# Patient Record
Sex: MALE | Race: WHITE | NOT HISPANIC OR LATINO | URBAN - METROPOLITAN AREA
[De-identification: names, ages, dates, MRNs, and addresses within clinical notes are randomized per-mention and may not be internally consistent; named-entity substitution may affect disease eponyms.]

---

## 2018-07-26 ENCOUNTER — APPOINTMENT (OUTPATIENT)
Dept: RADIOLOGY | Facility: MEDICAL CENTER | Age: 63
End: 2018-07-26
Attending: EMERGENCY MEDICINE
Payer: COMMERCIAL

## 2018-07-26 ENCOUNTER — HOSPITAL ENCOUNTER (OUTPATIENT)
Dept: RADIOLOGY | Facility: MEDICAL CENTER | Age: 63
End: 2018-07-26

## 2018-07-26 ENCOUNTER — HOSPITAL ENCOUNTER (EMERGENCY)
Facility: MEDICAL CENTER | Age: 63
End: 2018-07-27
Attending: EMERGENCY MEDICINE
Payer: COMMERCIAL

## 2018-07-26 DIAGNOSIS — L03.119 CELLULITIS OF FOOT: ICD-10-CM

## 2018-07-26 DIAGNOSIS — M79.89 FOOT SWELLING: ICD-10-CM

## 2018-07-26 LAB
BASOPHILS # BLD AUTO: 0.7 % (ref 0–1.8)
BASOPHILS # BLD: 0.05 K/UL (ref 0–0.12)
CRP SERPL HS-MCNC: 2.43 MG/DL (ref 0–0.75)
EOSINOPHIL # BLD AUTO: 0.2 K/UL (ref 0–0.51)
EOSINOPHIL NFR BLD: 3 % (ref 0–6.9)
ERYTHROCYTE [DISTWIDTH] IN BLOOD BY AUTOMATED COUNT: 40.2 FL (ref 35.9–50)
ERYTHROCYTE [SEDIMENTATION RATE] IN BLOOD BY WESTERGREN METHOD: 38 MM/HOUR (ref 0–20)
HCT VFR BLD AUTO: 34.8 % (ref 42–52)
HGB BLD-MCNC: 12.3 G/DL (ref 14–18)
IMM GRANULOCYTES # BLD AUTO: 0.02 K/UL (ref 0–0.11)
IMM GRANULOCYTES NFR BLD AUTO: 0.3 % (ref 0–0.9)
LYMPHOCYTES # BLD AUTO: 1.35 K/UL (ref 1–4.8)
LYMPHOCYTES NFR BLD: 20.1 % (ref 22–41)
MCH RBC QN AUTO: 31.4 PG (ref 27–33)
MCHC RBC AUTO-ENTMCNC: 35.3 G/DL (ref 33.7–35.3)
MCV RBC AUTO: 88.8 FL (ref 81.4–97.8)
MONOCYTES # BLD AUTO: 0.4 K/UL (ref 0–0.85)
MONOCYTES NFR BLD AUTO: 6 % (ref 0–13.4)
NEUTROPHILS # BLD AUTO: 4.7 K/UL (ref 1.82–7.42)
NEUTROPHILS NFR BLD: 69.9 % (ref 44–72)
NRBC # BLD AUTO: 0 K/UL
NRBC BLD-RTO: 0 /100 WBC
PLATELET # BLD AUTO: 196 K/UL (ref 164–446)
PMV BLD AUTO: 10.6 FL (ref 9–12.9)
RBC # BLD AUTO: 3.92 M/UL (ref 4.7–6.1)
WBC # BLD AUTO: 6.7 K/UL (ref 4.8–10.8)

## 2018-07-26 PROCEDURE — A9270 NON-COVERED ITEM OR SERVICE: HCPCS | Performed by: EMERGENCY MEDICINE

## 2018-07-26 PROCEDURE — 85025 COMPLETE CBC W/AUTO DIFF WBC: CPT

## 2018-07-26 PROCEDURE — 86140 C-REACTIVE PROTEIN: CPT

## 2018-07-26 PROCEDURE — 36415 COLL VENOUS BLD VENIPUNCTURE: CPT

## 2018-07-26 PROCEDURE — 73700 CT LOWER EXTREMITY W/O DYE: CPT | Mod: RT

## 2018-07-26 PROCEDURE — 99284 EMERGENCY DEPT VISIT MOD MDM: CPT

## 2018-07-26 PROCEDURE — 700102 HCHG RX REV CODE 250 W/ 637 OVERRIDE(OP): Performed by: EMERGENCY MEDICINE

## 2018-07-26 PROCEDURE — 85652 RBC SED RATE AUTOMATED: CPT

## 2018-07-26 RX ORDER — CEPHALEXIN 500 MG/1
500 CAPSULE ORAL 3 TIMES DAILY
Qty: 30 CAP | Refills: 0 | Status: SHIPPED | OUTPATIENT
Start: 2018-07-26 | End: 2018-08-05

## 2018-07-26 RX ORDER — CEPHALEXIN 500 MG/1
500 CAPSULE ORAL ONCE
Status: COMPLETED | OUTPATIENT
Start: 2018-07-27 | End: 2018-07-26

## 2018-07-26 RX ADMIN — CEPHALEXIN 500 MG: 500 CAPSULE ORAL at 23:42

## 2018-07-26 ASSESSMENT — LIFESTYLE VARIABLES: DO YOU DRINK ALCOHOL: NO

## 2018-07-26 NOTE — LETTER
[facility], EMERGENCY DEPT   [address], [place] [address]  Phone: Dept: [phone] - Fax:        [facility] Progress Report and Disability Certification  Date of Service: 7/26/2018   No Show:  No  Date / Time of Next Visit:     Claim Information   Patient Name: Jeromy Reina  Claim Number:     Employer: [employer]  Date of Injury: 7/23/2018     Insurer / TPA:  [place] [employer] Insurance ID / SSN: [id number]   Occupation: Equip  Diagnosis: Diagnoses of Cellulitis of foot and Foot swelling were pertinent to this visit.    Medical Information   Related to Industrial Injury?     Subjective Complaints:      Objective Findings:     Pre-Existing Condition(s):     Assessment:        Status:    Permanent Disability:     Plan:      Diagnostics:      Comments:       Disability Information   Status:      From:     Through:   Restrictions are:     Physical Restrictions   Sitting:    Standing:    Stooping:    Bending:      Squatting:    Walking:    Climbing:    Pushing:      Pulling:    Other:    Reaching Above Shoulder (L):   Reaching Above Shoulder (R):       Reaching Below Shoulder (L):    Reaching Below Shoulder (R):      Not to exceed Weight Limits   Carrying(hrs):   Weight Limit(lb):   Lifting(hrs):   Weight  Limit(lb):     Comments:      Repetitive Actions   Hands: i.e. Fine Manipulations from Grasping:     Feet: i.e. Operating Foot Controls:     Driving / Operate Machinery:     Physician Name: Alisia Curtis Physician Signature: ALISIA Calhoun M.D. e-Signature:  , Medical Director   Clinic Name / Location: [facility], EMERGENCY DEPT  [address] [place] [id number] [phone]     Clinic Phone Number: Dept: [phone]   Appointment Time:  Visit Start Time:    Check-In Time:  8:43 PM Visit Discharge Time: 1:28 AM   Original-Treating Physician or Chiropractor    Page 2-Insurer/TPA    Page  3-Employer    Page 4-Employee

## 2018-07-26 NOTE — LETTER
[facility], EMERGENCY DEPT   [address], [place] [address]  Phone: Dept: [phone] - Fax:        [facility] Progress Report and Disability Certification  Date of Service: 7/26/2018   No Show:  No  Date / Time of Next Visit:     Claim Information   Patient Name: Jeromy Reina  Claim Number:     Employer: [employer]  Date of Injury: 7/23/2018     Insurer / TPA:  [employer] Insurance ID / SSN: [id number]   Occupation: Equip  Diagnosis: Diagnoses of Cellulitis of foot and Foot swelling were pertinent to this visit.    Medical Information   Related to Industrial Injury?      Subjective Complaints:  Logging accident with injury to right leg, blisters to the area   Objective Findings: Edema to right leg, echymosis noted   Pre-Existing Condition(s):     Assessment:        Status: Additional Care Required  Permanent Disability:     Plan: Medication    Diagnostics: CT    Comments:  Reviewed studies    Disability Information   Status: Temporarily Totally Disabled    From:     Through:   Restrictions are:     Physical Restrictions   Sitting:    Standing:    Stooping:    Bending:      Squatting:    Walking:    Climbing:    Pushing:      Pulling:    Other:    Reaching Above Shoulder (L):   Reaching Above Shoulder (R):       Reaching Below Shoulder (L):    Reaching Below Shoulder (R):      Not to exceed Weight Limits   Carrying(hrs):   Weight Limit(lb):   Lifting(hrs):   Weight  Limit(lb):     Comments:      Repetitive Actions   Hands: i.e. Fine Manipulations from Grasping:     Feet: i.e. Operating Foot Controls:     Driving / Operate Machinery:     Physician Name: Alisia Curtis Physician Signature: ALISIA Calhoun M.D. e-Signature:  , Medical Director   Clinic Name / Location: [facility], EMERGENCY DEPT  [address] [facility] [address]  [phone]     Clinic Phone Number: Dept: [phone]      Appointment Time:  Visit Start Time:    Check-In Time:  8:43 PM Visit Discharge Time: 1:28 AM   Original-Treating Physician or Chiropractor    Page 2-Insurer/TPA    Page 3-Employer    Page 4-Employee

## 2018-07-26 NOTE — LETTER
"  FORM C-4:  EMPLOYEE’S CLAIM FOR COMPENSATION/ REPORT OF INITIAL TREATMENT  EMPLOYEE’S CLAIM - PROVIDE ALL INFORMATION REQUESTED   First Name  Jeromy Last Name  Latosha Birthdate             Age  1955 63 y.o. 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 (if applicable)   parking tractor   How did this injury or occupational disease occur? Be specific and answer in detail. Use additional sheet if necessary)  Parking the tractor, bank gave way tractor rolled, threw me out and the machine landed on my foot.   If you believe that you have an occupational disease, when did you first have knowledge of the disability and it relationship to your employment?   Witnesses to the Accident    n/a   Nature of Injury or Occupational Disease  Workers Comp  Part(s) of Body Injured or Affected     Foot (R)   I certify that the above is true and correct to the best of my knowledge and that I have provided this information in order to obtain the benefits of [place]  I hereby authorize any physician, chiropractor, surgeon, practitioner, or other person, any hospital, including [facility] or [facility] hospital, any medical service organization, any insurance company, or other institution or organization to release to each other, any medical or other information, including benefits paid or payable, pertinent to this injury or disease, except information relative to diagnosis, treatment and/or counseling for AIDS, psychological conditions, alcohol or controlled substances, for which I must give specific authorization.  A Photostat of this authorization shall be as valid as the original.   Date  07/27/18 Place   Employee’s Signature   THIS REPORT MUST BE COMPLETED AND MAILED WITHIN 3 WORKING DAYS OF TREATMENT   Place  [facility], EMERGENCY DEPT  Name of Facility   [facility]   Date  7/26/2018 Diagnosis  (L03.119) Cellulitis of foot  (M79.89) Foot swelling Is there evidence the injured employee was under the influence of alcohol and/or another controlled substance at the time of accident?   Hour  12:29 AM Description of Injury or Disease  Cellulitis of foot  Foot swelling No   Treatment  antibiotics  Have you advised the patient to remain off work five days or more?         Yes   X-Ray Findings  Negative   If Yes   From Date  7/26/2018 To Date  8/1/2018   From information given by the employee, together with medical evidence, can you directly connect this injury or occupational disease as job incurred?  Yes If No, is the employee capable of: Full Duty  No Modified Duty  Yes   Is additional medical care by a physician indicated?  Yes If Modified Duty, Specify any Limitations / Restrictions  No walking     Do you know of any previous injury or disease contributing to this condition or occupational disease?  No   Date  7/27/2018 Print Doctor’s Name  Alisia Curtis I certify the employer’s copy of  "this form was mailed on:   Address  [address]  [place] [address] [phone] Insurer’s Use Only   [facility]  [address]    Provider’s Tax ID Number  [id number] Telephone  Dept: [phone]    Doctor’s Signature  e-JORGE Lomax M.D., MD    Original - TREATING PHYSICIAN OR CHIROPRACTOR   Pg 2-Insurer/TPA   Pg 3-Employer   Pg 4-Employee                                                                                                  Form C-4 (rev01/03)     BRIEF DESCRIPTION OF RIGHTS AND BENEFITS  (Pursuant to [place] 616C.050)    Notice of Injury or Occupational Disease (Incident Report Form C-1): If an injury or occupational disease (OD) arises out of and in the course of employment, you must provide written notice to your employer as soon as practicable, but no later than 7 days after the accident or OD. Your employer shall maintain a sufficient supply of the required forms.  Claim for Compensation (Form C-4): If medical treatment is sought, the form C-4 is available at the place of initial treatment. A completed \"Claim for Compensation\" (Form C-4) must be filed within 90 days after an accident or OD. The treating physician or chiropractor must, within 3 working days after treatment, complete and mail to the employer, the employer's insurer and third-party , the Claim for Compensation.  Medical Treatment: If you require medical treatment for your on-the-job injury or OD, you may be required to select a physician or chiropractor from a list provided by your workers’ compensation insurer, if it has contracted with an Organization for Managed Care (MCO) or Preferred Provider Organization (PPO) or providers of health care. If your employer has not entered into a contract with an MCO or PPO, you may select a physician or chiropractor from the Panel of Physicians and Chiropractors. Any medical costs related to your industrial injury or OD will be paid by your " insurer.  Temporary Total Disability (TTD): If your doctor has certified that you are unable to work for a period of at least 5 consecutive days, or 5 cumulative days in a 20-day period, or places restrictions on you that your employer does not accommodate, you may be entitled to TTD compensation.  Temporary Partial Disability (TPD): If the wage you receive upon reemployment is less than the compensation for TTD to which you are entitled, the insurer may be required to pay you TPD compensation to make up the difference. TPD can only be paid for a maximum of 24 months.  Permanent Partial Disability (PPD): When your medical condition is stable and there is an indication of a PPD as a result of your injury or OD, within 30 days, your insurer must arrange for an evaluation by a rating physician or chiropractor to determine the degree of your PPD. The amount of your PPD award depends on the date of injury, the results of the PPD evaluation and your age and wage.  Permanent Total Disability (PTD): If you are medically certified by a treating physician or chiropractor as permanently and totally disabled and have been granted a PTD status by your insurer, you are entitled to receive monthly benefits not to exceed 66 2/3% of your average monthly wage. The amount of your PTD payments is subject to reduction if you previously received a PPD award.  Vocational Rehabilitation Services: You may be eligible for vocational rehabilitation services if you are unable to return to the job due to a permanent physical impairment or permanent restrictions as a result of your injury or occupational disease.  Transportation and Per [place] Reimbursement: You may be eligible for travel expenses and per mason associated with medical treatment.  Reopening: You may be able to reopen your claim if your condition worsens after claim closure.  Appeal Process: If you disagree with a written determination issued by the insurer or the insurer does not  respond to your request, you may appeal to the Department of Administration, , by following the instructions contained in your determination letter. You must appeal the determination within 70 days from the date of the determination letter at [address], [address], [place], [place] [address], or [address], [address], [place], [place] [address]. If you disagree with the  decision, you may appeal to the Department of Administration, . You must file your appeal within 30 days from the date of the  decision letter at [address], [address], [place], [place] [address], or [address], [address], [place], [place] [address]. If you disagree with a decision of an , you may file a petition for judicial review with the District Court. You must do so within 30 days of the Appeal Officer’s decision. You may be represented by an  at your own expense or you may contact the [facility] for possible representation.  [place]  for Injured Workers (NAIW): If you disagree with a  decision, you may request that NAIW represent you without charge at an  Hearing. For information regarding denial of benefits, you may contact the [facility] at: [address], [address], [place] [address], [phone], or [address], [address], [place] [address], [phone]  To File a Complaint with the Division: If you wish to file a complaint with the  of the Division of Industrial Relations (DIR), please contact the Workers’ Compensation Section, [address], [address], [place], [place] [address], telephone (669) [phone], or [address] [facility] [address], [place] [address], telephone ([phone]) [phone].  For assistance with Workers’ Compensation Issues: you may contact the Office of the Governor Consumer Health Assistance, [address],  [address], [place], [place] [address], Toll Free 1-992.895.4101, Web site: http://govcha.[facility].nv., E-mail garrison@[facility].nv.                                                                                                                                                                               __________________________________________________________________                                    _________________            Employee Name / Signature                                                                                                                            Date                                       D-2 (rev. 10/07)

## 2018-07-26 NOTE — LETTER
"  FORM C-4:  EMPLOYEE’S CLAIM FOR COMPENSATION/ REPORT OF INITIAL TREATMENT  EMPLOYEE’S CLAIM - PROVIDE ALL INFORMATION REQUESTED   First Name  Jeromy Last Name  Latosha Birthdate             Age  1955 63 y.o. 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 (if applicable)  parking tractor    How did this injury or occupational disease occur? Be specific and answer in detail. Use additional sheet if necessary)  parking the tractor, bank gave way tractor rolled, threw me out and the machine landed on my foot.   If you believe that you have an occupational disease, when did you first have knowledge of the disability and it relationship to your employment?  n/a Witnesses to the Accident  n/a     Nature of Injury or Occupational Disease  Workers' Compensation  Part(s) of Body Injured or Affected  Foot (R), N/A, N/A    I certify that the above is true and correct to the best of my knowledge and that I have provided this information in order to obtain the benefits of [place]ative Insurance and " [place]  I hereby authorize any physician, chiropractor, surgeon, practitioner, or other person, any hospital, including [facility] or [facility] hospital, any medical service organization, any insurance company, or other institution or organization to release to each other, any medical or other information, including benefits paid or payable, pertinent to this injury or disease, except information relative to diagnosis, treatment and/or counseling for AIDS, psychological conditions, alcohol or controlled substances, for which I must give specific authorization.  A Photostat of this authorization shall be as valid as the original.   Date  07/27/18 Place  [facility]  Employee’s Signature   THIS REPORT MUST BE COMPLETED AND MAILED WITHIN 3 WORKING DAYS OF TREATMENT   Place  [facility], EMERGENCY DEPT  Name of Facility   [facility]   Date  7/26/2018 Diagnosis  (L03.119) Cellulitis of foot  (M79.89) Foot swelling Is there evidence the injured employee was under the influence of alcohol and/or another controlled substance at the time of accident?   Hour  12:35 AM Description of Injury or Disease  Cellulitis of foot  Foot swelling No   Treatment  antibiotics  Have you advised the patient to remain off work five days or more?         Yes   X-Ray Findings  Negative   If Yes   From Date  7/26/2018 To Date  8/1/2018   From information given by the employee, together with medical evidence, can you directly connect this injury or occupational disease as job incurred?  Yes If No, is the employee capable of: Full Duty  No Modified Duty  Yes   Is additional medical care by a physician indicated?  Yes If Modified Duty, Specify any Limitations / Restrictions  No walking     Do you know of any previous injury or disease contributing to this condition or occupational disease?  No   Date  7/27/2018 Print  "Doctor’s Name  Jorge Curtis I certify the employer’s copy of this form was mailed on:   Address  [address] [address] [phone] Insurer’s Use Only   [facility]  [address]    Provider’s Tax ID Number  [id number] Telephone  Dept: [phone]    Doctor’s Signature  e-JORGE Lomax M.D. Degree   MD    Original - TREATING PHYSICIAN OR CHIROPRACTOR   Pg 2-Insurer/TPA   Pg 3-Employer   Pg 4-Employee                                                                                                  Form C-4 (rev01/03)     BRIEF DESCRIPTION OF RIGHTS AND BENEFITS  (Pursuant to [place]RS 616C.050)    Notice of Injury or Occupational Disease (Incident Report Form C-1): If an injury or occupational disease (OD) arises out of and in the course of employment, you must provide written notice to your employer as soon as practicable, but no later than 7 days after the accident or OD. Your employer shall maintain a sufficient supply of the required forms.  Claim for Compensation (Form C-4): If medical treatment is sought, the form C-4 is available at the place of initial treatment. A completed \"Claim for Compensation\" (Form C-4) must be filed within 90 days after an accident or OD. The treating physician or chiropractor must, within 3 working days after treatment, complete and mail to the employer, the employer's insurer and third-party , the Claim for Compensation.  Medical Treatment: If you require medical treatment for your on-the-job injury or OD, you may be required to select a physician or chiropractor from a list provided by your workers’ compensation insurer, if it has contracted with an Organization for Managed Care (MCO) or Preferred Provider Organization (PPO) or providers of health care. If your employer has not entered into a contract with an MCO or PPO, you may select a physician or chiropractor from the Panel of Physicians and Chiropractors. Any medical costs related " to your industrial injury or OD will be paid by your insurer.  Temporary Total Disability (TTD): If your doctor has certified that you are unable to work for a period of at least 5 consecutive days, or 5 cumulative days in a 20-day period, or places restrictions on you that your employer does not accommodate, you may be entitled to TTD compensation.  Temporary Partial Disability (TPD): If the wage you receive upon reemployment is less than the compensation for TTD to which you are entitled, the insurer may be required to pay you TPD compensation to make up the difference. TPD can only be paid for a maximum of 24 months.  Permanent Partial Disability (PPD): When your medical condition is stable and there is an indication of a PPD as a result of your injury or OD, within 30 days, your insurer must arrange for an evaluation by a rating physician or chiropractor to determine the degree of your PPD. The amount of your PPD award depends on the date of injury, the results of the PPD evaluation and your age and wage.  ermanent Total Disability (PTD): If you are medically certified by a treating physician or chiropractor as permanently and totally disabled and have been granted a PTD status by your insurer, you are entitled to receive monthly benefits not to exceed 66 2/3% of your average monthly wage. The amount of your PTD payments is subject to reduction if you previously received a PPD award.  Vocational Rehabilitation Services: You may be eligible for vocational rehabilitation services if you are unable to return to the job due to a permanent physical impairment or permanent restrictions as a result of your injury or occupational disease.  Transportation and Per Mason Reimbursement: You may be eligible for travel expenses and per mason associated with medical treatment.  Reopening: You may be able to reopen your claim if your condition worsens after claim closure.  Appeal Process: If you disagree with a written  determination issued by the insurer or the insurer does not respond to your request, you may appeal to the Department of Administration, , by following the instructions contained in your determination letter. You must appeal the determination within 70 days from the date of the determination letter at [address], [address], [place], [place] [address], or [address], [address], [place], [place] [address]. If you disagree with the  decision, you may appeal to the Department of Administration, . You must file your appeal within 30 days from the date of the  decision letter at [address], [address], [place], [place] [address], or [address], [address], [place], [place] [address]. If you disagree with a decision of an , you may file a petition for judicial review with the District Court. You must do so within 30 days of the Appeal Officer’s decision. You may be represented by an  at your own expense or you may contact the [facility] for possible representation.  [place]  for Injured Workers (NAIW): If you disagree with a  decision, you may request that NAIW represent you without charge at an  Hearing. For information regarding denial of benefits, you may contact the [facility] at: [address], [address], [place] [address], [phone], or [address], [place] [address], [phone]  To File a Complaint with the Division: If you wish to file a complaint with the  of the Division of Industrial Relations (DIR), please contact the Workers’ Compensation Section, [address], [address], [place], [place] [address], telephone (723) 473-4299, or [address], [address], [place] [address], telephone [phone]) [phone].  For assistance with Workers’ Compensation Issues: you may contact the Office of the  [facility] Consumer Health Assistance, [address], [address], [place] [address], Toll Free 1-608.661.1662, Web site: http://govcha.[facility].nv., E-mail garrison@[facility].nv.                                                                                                                                                                               __________________________________________________________________                                    _________________            Employee Name / Signature                                                                                                                            Date                                       D-2 (rev. 10/07)

## 2018-07-26 NOTE — LETTER
[facility], EMERGENCY DEPT   [address], [place] [address]  Phone: Dept: [phone] - Fax:        [facility] Progress Report and Disability Certification  Date of Service: 7/26/2018   No Show:  No  Date / Time of Next Visit:     Claim Information   Patient Name: Jeromy Reina  Claim Number:     Employer: [employer]  Date of Injury: 7/23/2018     Insurer / TPA:  [employer] Insurance ID / SSN: [id number]   Occupation: Equip  Diagnosis: Diagnoses of Cellulitis of foot and Foot swelling were pertinent to this visit.    Medical Information   Related to Industrial Injury? Yes ***   Subjective Complaints:    Logging accident with injury to right leg, blisters to the area   Objective Findings: Edema to right leg, echymosis noted   Pre-Existing Condition(s):     Assessment:        Status: Additional Care Required  Permanent Disability:     Plan: Medication    Diagnostics: CT    Comments:  Reviewed studies    Disability Information   Status: Temporarily Totally Disabled    From:     Through:   Restrictions are:     Physical Restrictions   Sitting:    Standing:    Stooping:    Bending:      Squatting:    Walking:    Climbing:    Pushing:      Pulling:    Other:    Reaching Above Shoulder (L):   Reaching Above Shoulder (R):       Reaching Below Shoulder (L):    Reaching Below Shoulder (R):      Not to exceed Weight Limits   Carrying(hrs):   Weight Limit(lb):   Lifting(hrs):   Weight  Limit(lb):     Comments:      Repetitive Actions   Hands: i.e. Fine Manipulations from Grasping:     Feet: i.e. Operating Foot Controls:     Driving / Operate Machinery:     Physician Name: Alisia Curtis Physician Signature: ALISIA Calhoun M.D. e-Signature:  , Medical Director   Clinic Name / Location: [facility], EMERGENCY DEPT  [address] [address]  [phone]     Clinic Phone Number: Dept:  [phone]   Appointment Time:  Visit Start Time:    Check-In Time:  8:43 PM Visit Discharge Time: 1:28 AM   Original-Treating Physician or Chiropractor    Page 2-Insurer/TPA    Page 3-Employer    Page 4-Employee

## 2018-07-27 VITALS
HEIGHT: 68 IN | HEART RATE: 82 BPM | WEIGHT: 190 LBS | RESPIRATION RATE: 20 BRPM | OXYGEN SATURATION: 96 % | TEMPERATURE: 99 F | BODY MASS INDEX: 28.79 KG/M2

## 2018-07-27 NOTE — ED PROVIDER NOTES
ED Provider Note    Scribed for Alisia Curtis M.D. by Lenny Diaz. 7/26/2018, 9:05 PM.    Means of arrival: EMS  History obtained from: Patient  History limited by: None     CHIEF COMPLAINT  Chief Complaint   Patient presents with   • Blister     Pt bib ems from [facility] as transfer [facility]. One week ago was on a piece of equipment and it fell over. Pt had foot trapped for appx 1 hour. Pt went to hospital today because of blistering to Rt foot. Pt was diagnosed with Necrotizing fasciatis.      BEAU Reina is a 63 y.o. male who was transferred to the Emergency Department from [facility] for concern for necrotizing fascitis.     The patient had a logging accident three days ago, when a heavy piece of logging equipment (thousands of lbs) fell onto his right lower leg and was stuck on top of his right leg for approximately 1 hour. The patient was seen at [facility] following his right leg injury, and was discharged after he had X Rays performed that were negative.     The patient presented to [facility] today for recheck of his right lower leg injury.   Since his discharge from [facility] three days ago, the patient complains his right lower leg has felt numb with some associated right foot swelling. He developed the appearance of a few blisters over his right lower leg in the last two days. He was transferred to this ED for concern for necrotizing fascitis. The patient denies any worsening right lower leg pain.     The patient denies any fever, nausea, vomiting, chest pain, shortness of breath.     REVIEW OF SYSTEMS  Pertinent positives include right foot swelling, right lower leg numbness and blisters. Pertinent negatives include no fever, nausea, vomiting, chest pain, shortness of breath.  All other systems reviewed and negative.    PAST MEDICAL HISTORY  Diabetes Mellitus.     SURGICAL HISTORY  patient denies any surgical history    SOCIAL HISTORY  Patient lives in [facility].  "  Transferred from [facility].   History   Drug use: Unknown     FAMILY HISTORY  None noted.     CURRENT MEDICATIONS  Home Medications    **Home medications have not yet been reviewed for this encounter**       ALLERGIES  No Known Allergies    PHYSICAL EXAM  VITAL SIGNS: Pulse 87   Temp 37.2 °C (99 °F)   Resp 19   Ht 1.727 m (5' 8\")   Wt 86.2 kg (190 lb)   SpO2 93%   BMI 28.89 kg/m²   Constitutional: Alert in no apparent distress. Non toxic appearance.   HENT: No signs of trauma, Bilateral external ears normal, Nose normal.   Eyes: Pupils are equal and reactive, Conjunctiva normal, Non-icteric.   Neck: Normal range of motion, No tenderness, Supple, No stridor.   Cardiovascular: Regular rate and rhythm, no murmurs.   Thorax & Lungs: Normal breath sounds, No respiratory distress, No wheezing, No chest tenderness.  Abdomen: Bowel sounds normal, Soft, No tenderness, No masses, No peritoneal signs.  Skin: Warm, Dry, No erythema, No rash.  Musculoskeletal: Right leg: mild diffuse swelling. No crepitus. Ecchymosis over right medial foot and multiple fluid filed blisters over lateral right foot and ankle. No subcutaneous crepitus.   Neurologic: Alert, no focal deficits.    LABS  Labs Reviewed   CRP QUANTITIVE (NON-CARDIAC) - Abnormal; Notable for the following:        Result Value    Stat C-Reactive Protein 2.43 (*)     All other components within normal limits   CBC WITH DIFFERENTIAL - Abnormal; Notable for the following:     RBC 3.92 (*)     Hemoglobin 12.3 (*)     Hematocrit 34.8 (*)     Lymphocytes 20.10 (*)     All other components within normal limits   WESTERGREN SED RATE - Abnormal; Notable for the following:     Sed Rate Westergren 38 (*)     All other components within normal limits     All labs reviewed by me.    RADIOLOGY  CT-ANKLE W/O PLUS RECONS RIGHT   Final Result         1.  No acute traumatic bony injury.   2.  Changes suggesting cellulitis.   3.  Clusters along the dorsal foot.    "   CT-FOOT W/O PLUS RECONS RIGHT   Final Result         1.  No acute traumatic bony injury identified.      OUTSIDE IMAGES-DX LOWER EXTREMITY, RIGHT   Final Result      OUTSIDE IMAGES-DX LOWER EXTREMITY, RIGHT   Final Result        The radiologist's interpretation of all radiological studies have been reviewed by me.    COURSE & MEDICAL DECISION MAKING  Pertinent Labs & Imaging studies reviewed. (See chart for details)  Review prior medical records from [facility] which show the patient had: Normal CBC, X rays were negative.     Differential diagnoses include but are not limited to: pressure blisters, infection     9:05 PM - Patient seen and examined at bedside. Ordered Westergren sed rate, CRP Quant, CBC, CT Ankle, CT Right foot to evaluate his symptoms.     11:02 PM Spoke with Dr. Diaz, Orthopedics, about the patient's condition. Dr. Diaz does not believe patient's clinical presentation is consistent with necrotizing fascitis.  I spoke with the patient again about his results at this time he has a normal white blood cell count without left shift.  He has a slightly elevated sed rate and CRP CT scans do not show any evidence of subcutaneous air.  There is some stranding in the ankle and there may be early cellulitis.  However I do not think this is necrotizing fasciitis.  I do not think he has compartment syndrome he has no pain he is able to ambulate he has good capillary refill and good pulses.  I discussed this with the patient at this point I do not see an indication for admitting and he does not need the operating room.  He has no way to get back to [place] he will be seen by social work and he will get an Uber back home.  He will be given a short prescription of Keflex for possible early cellulitis and he will be discharged.       The patient will return for new or worsening symptoms and is stable at the time of discharge. Patient was given return precautions. Patient and/or  family member verbalizes understanding and will comply.    DISPOSITION:  Patient will be discharged home in stable condition.    FOLLOW UP:  [facility], Emergency Dept  [address]  [place] [address] [phone]    Return for worsening pain, redness, swelling, fevers or other concerns.      OUTPATIENT MEDICATIONS:  New Prescriptions    CEPHALEXIN (KEFLEX) 500 MG CAP    Take 1 Cap by mouth 3 times a day for 10 days.         FINAL IMPRESSION  1. Cellulitis of foot    2. Foot swelling         Your blood pressure is elevated here in the emergency department. Please monitor your blood pressure over the next several days. If your blood pressure continues to be 120/80 or higher please contact your physician for blood pressure management.     This dictation has been created using voice recognition software and/or scribes. The accuracy of the dictation is limited by the abilities of the software and the expertise of the scribes. I expect there may be some errors of grammar and possibly content. I made every attempt to manually correct the errors within my dictation. However, errors related to voice recognition software and/or scribes may still exist and should be interpreted within the appropriate context.     Lenny MAKI (Scribe), am scribing for, and in the presence of, Alisia Curtis M.D..    Electronically signed by: Lenny Diaz (Scribe), 7/26/2018    Alisia MAKI M.D. personally performed the services described in this documentation, as scribed by Lenny Diaz in my presence, and it is both accurate and complete.    The note accurately reflects work and decisions made by me.  Alisia Curtis  7/27/2018  12:51 AM

## 2018-07-27 NOTE — DISCHARGE INSTRUCTIONS
Cellulitis, Adult  Cellulitis is a skin infection. The infected area is usually red and tender. This condition occurs most often in the arms and lower legs. The infection can travel to the muscles, blood, and underlying tissue and become serious. It is very important to get treated for this condition.  What are the causes?  Cellulitis is caused by bacteria. The bacteria enter through a break in the skin, such as a cut, burn, insect bite, open sore, or crack.  What increases the risk?  This condition is more likely to occur in people who:  · Have a weak defense system (immune system).  · Have open wounds on the skin such as cuts, burns, bites, and scrapes. Bacteria can enter the body through these open wounds.  · Are older.  · Have diabetes.  · Have a type of long-lasting (chronic) liver disease (cirrhosis) or kidney disease.  · Use IV drugs.  What are the signs or symptoms?  Symptoms of this condition include:  · Redness, streaking, or spotting on the skin.  · Swollen area of the skin.  · Tenderness or pain when an area of the skin is touched.  · Warm skin.  · Fever.  · Chills.  · Blisters.  How is this diagnosed?  This condition is diagnosed based on a medical history and physical exam. You may also have tests, including:  · Blood tests.  · Lab tests.  · Imaging tests.  How is this treated?  Treatment for this condition may include:  · Medicines, such as antibiotic medicines or antihistamines.  · Supportive care, such as rest and application of cold or warm cloths (cold or warm compresses) to the skin.  · Hospital care, if the condition is severe.  The infection usually gets better within 1-2 days of treatment.  Follow these instructions at home:  · Take over-the-counter and prescription medicines only as told by your health care provider.  · If you were prescribed an antibiotic medicine, take it as told by your health care provider. Do not stop taking the antibiotic even if you start to feel better.  · Drink  enough fluid to keep your urine clear or pale yellow.  · Do not touch or rub the infected area.  · Raise (elevate) the infected area above the level of your heart while you are sitting or lying down.  · Apply warm or cold compresses to the affected area as told by your health care provider.  · Keep all follow-up visits as told by your health care provider. This is important. These visits let your health care provider make sure a more serious infection is not developing.  Contact a health care provider if:  · You have a fever.  · Your symptoms do not improve within 1-2 days of starting treatment.  · Your bone or joint underneath the infected area becomes painful after the skin has healed.  · Your infection returns in the same area or another area.  · You notice a swollen bump in the infected area.  · You develop new symptoms.  · You have a general ill feeling (malaise) with muscle aches and pains.  Get help right away if:  · Your symptoms get worse.  · You feel very sleepy.  · You develop vomiting or diarrhea that persists.  · You notice red streaks coming from the infected area.  · Your red area gets larger or turns dark in color.  This information is not intended to replace advice given to you by your health care provider. Make sure you discuss any questions you have with your health care provider.  Document Released: 09/27/2006 Document Revised: 04/27/2017 Document Reviewed: 10/26/2016  [employer] Interactive Patient Education © 2017 [employer]

## 2018-07-27 NOTE — ED NOTES
Pt Given discharge instructions/ prescriptions/ home care instructions, Pt verbalized understanding of instructions given, pt ambulatory to [facility].

## 2018-07-27 NOTE — ED TRIAGE NOTES
Chief Complaint   Patient presents with   • Blister     Pt bib ems from [facility] as transfer [facility]. One week ago was on a piece of equipment and it fell over. Pt had foot trapped for appx 1 hour. Pt went to hospital today because of blistering to Rt foot. Pt was diagnosed with Necrotizing fasciatis.

## 2018-07-27 NOTE — DISCHARGE PLANNING
Medical Social Work     FRANDY received a call from the RN requesting assistance with transportation. The pt does not have MTM or funds to get home as he was transferred from [facility]. FRANDY set up an uber for the pt.